# Patient Record
Sex: MALE | Race: WHITE | NOT HISPANIC OR LATINO | URBAN - METROPOLITAN AREA
[De-identification: names, ages, dates, MRNs, and addresses within clinical notes are randomized per-mention and may not be internally consistent; named-entity substitution may affect disease eponyms.]

---

## 2022-11-12 ENCOUNTER — EMERGENCY (EMERGENCY)
Facility: HOSPITAL | Age: 32
LOS: 0 days | Discharge: LEFT BEFORE DISCHARGE | End: 2022-11-12
Attending: EMERGENCY MEDICINE | Admitting: EMERGENCY MEDICINE

## 2022-11-12 VITALS
SYSTOLIC BLOOD PRESSURE: 109 MMHG | TEMPERATURE: 97 F | WEIGHT: 179.9 LBS | HEART RATE: 71 BPM | DIASTOLIC BLOOD PRESSURE: 71 MMHG | RESPIRATION RATE: 18 BRPM | OXYGEN SATURATION: 97 %

## 2022-11-12 DIAGNOSIS — Z53.21 PROCEDURE AND TREATMENT NOT CARRIED OUT DUE TO PATIENT LEAVING PRIOR TO BEING SEEN BY HEALTH CARE PROVIDER: ICD-10-CM

## 2022-11-12 DIAGNOSIS — F10.929 ALCOHOL USE, UNSPECIFIED WITH INTOXICATION, UNSPECIFIED: ICD-10-CM

## 2022-11-12 PROCEDURE — L9991: CPT

## 2022-11-12 NOTE — ED ADULT NURSE REASSESSMENT NOTE - NS ED NURSE REASSESS COMMENT FT1
Patients wife Virgie [phone] came to pick patient up. Pt axox3 and has been cooperative with staff.

## 2023-05-19 ENCOUNTER — APPOINTMENT (OUTPATIENT)
Dept: ORTHOPEDIC SURGERY | Facility: CLINIC | Age: 33
End: 2023-05-19
Payer: MEDICAID

## 2023-05-19 VITALS — WEIGHT: 200 LBS | BODY MASS INDEX: 32.14 KG/M2 | HEIGHT: 66 IN

## 2023-05-19 PROBLEM — Z00.00 ENCOUNTER FOR PREVENTIVE HEALTH EXAMINATION: Status: ACTIVE | Noted: 2023-05-19

## 2023-05-19 PROCEDURE — L4360 PNEUMAT WALKING BOOT PRE CST: CPT | Mod: RT

## 2023-05-19 PROCEDURE — 99203 OFFICE O/P NEW LOW 30 MIN: CPT

## 2023-05-19 NOTE — DISCUSSION/SUMMARY
[de-identified] : The x-ray images were imported into our PACS system.  The patient was placed into a tall cam walker boot and advised to be nonweightbearing with crutches.  He was given crutches here in the office today.  He may continue taking naproxen for pain.  I recommend warm water soaks with Epsom salt.  He will follow-up in a week with Dr. Mabry for further evaluation.

## 2023-05-19 NOTE — IMAGING
[de-identified] : Physical exam of the right ankle and foot.  There is edema of the right ankle and right foot.  There is ecchymosis noted over the medial aspect of the right lower leg tracking down to the dorsal surface of the right foot.  There is also a small area of ecchymosis noted on the plantar surface of the midfoot.  He has good range of motion with dorsiflexion, plantarflexion, inversion and eversion of the right ankle.  He has no tenderness to palpation over the medial or lateral malleoli.  No tenderness to palpation over the ankle ligaments.  No tenderness to palpation over the dorsal and plantar surfaces of the midfoot.  No tenderness to palpation over the first, second, third, fourth and fifth metatarsals or the right calcaneus. 2+dorsalis pedis pulse.  Today he is ambulating with a cane.  Intact to light touch and sensation.

## 2023-05-19 NOTE — HISTORY OF PRESENT ILLNESS
[de-identified] : The patient is a 32-year-old male here for an evaluation of his right ankle and foot.  5 days ago while helping a friend move he fell and all his weight went onto his right ankle.  He does have a history of a right ankle sprain in the distant past but nothing recently.  He developed pain and swelling and bruising.  He was seen and evaluated on 5/15/20[facility] and had x-rays of the right ankle and right foot which he brought in here on a disc for our review.  He was given naproxen has helped him.  He reports that the swelling in his right ankle and foot is starting to decrease.  He presents today in an Airc[employer] in a [employer] shoe.

## 2023-05-19 NOTE — DATA REVIEWED
[Foot] : foot [Right] : of the right [Ankle] : ankle [FreeTextEntry1] : X-rays taken on 5/15/2023 at [facility] of the right foot showed no acute fracture. [FreeTextEntry2] : X-rays taken on 5/15/2023 at [facility] of the right ankle showedan irregularity of the lateral aspect of the talus with a lucency suspicious for a fracture.

## 2023-05-31 ENCOUNTER — APPOINTMENT (OUTPATIENT)
Dept: ORTHOPEDIC SURGERY | Facility: CLINIC | Age: 33
End: 2023-05-31

## 2023-06-02 ENCOUNTER — APPOINTMENT (OUTPATIENT)
Dept: ORTHOPEDIC SURGERY | Facility: CLINIC | Age: 33
End: 2023-06-02
Payer: MEDICAID

## 2023-06-02 VITALS — BODY MASS INDEX: 32.14 KG/M2 | WEIGHT: 200 LBS | HEIGHT: 66 IN

## 2023-06-02 DIAGNOSIS — S92.191A: ICD-10-CM

## 2023-06-02 PROCEDURE — 99213 OFFICE O/P EST LOW 20 MIN: CPT

## 2023-06-02 PROCEDURE — 73610 X-RAY EXAM OF ANKLE: CPT | Mod: RT

## 2023-06-03 PROBLEM — S92.191A OTHER FRACTURE OF RIGHT TALUS, INITIAL ENCOUNTER FOR CLOSED FRACTURE: Status: ACTIVE | Noted: 2023-05-19

## 2023-06-03 NOTE — HISTORY OF PRESENT ILLNESS
[de-identified] : 32-year-old patient with an injury to his right ankle.  He was seen by one of our physician assistants 1 week ago.  His pain is improving however still there.  He has been compliant with using the boot.  Denies any interval trauma.

## 2023-06-03 NOTE — PHYSICAL EXAM
[de-identified] : He is alert oriented x3.  He is pleasant cooperative that exam.  I examined his right lower extremity.  The skin is intact.  There is swelling throughout the ankle.  He does have some decreased passive range of motion secondary to swelling but the strength and sensation are maintained.  He is neurovascular intact.

## 2023-06-03 NOTE — DATA REVIEWED
[FreeTextEntry1] : 3 views of the patient's right ankle ordered reviewed by me personally.  They demonstrate a possible minimally displaced lateral process of the talus fracture

## 2023-06-03 NOTE — DISCUSSION/SUMMARY
[de-identified] : I discussed the patient's findings with him.  This may take longer than the typical ankle sprain to heal however the patient can gradually wean himself off the boot.  We will see how he does with this.  I will see him back as needed.

## 2024-10-16 ENCOUNTER — NON-APPOINTMENT (OUTPATIENT)
Age: 34
End: 2024-10-16

## 2025-09-21 ENCOUNTER — NON-APPOINTMENT (OUTPATIENT)
Age: 35
End: 2025-09-21